# Patient Record
Sex: MALE | Race: WHITE | NOT HISPANIC OR LATINO | Employment: UNEMPLOYED | URBAN - METROPOLITAN AREA
[De-identification: names, ages, dates, MRNs, and addresses within clinical notes are randomized per-mention and may not be internally consistent; named-entity substitution may affect disease eponyms.]

---

## 2018-06-27 ENCOUNTER — CONVERSION ENCOUNTER (OUTPATIENT)
Dept: SURGERY | Facility: CLINIC | Age: 78
End: 2018-06-27

## 2018-06-27 ENCOUNTER — OFFICE VISIT CONVERTED (OUTPATIENT)
Dept: UROLOGY | Facility: CLINIC | Age: 78
End: 2018-06-27
Attending: UROLOGY

## 2018-07-20 ENCOUNTER — CONVERSION ENCOUNTER (OUTPATIENT)
Dept: UROLOGY | Facility: CLINIC | Age: 78
End: 2018-07-20
Attending: UROLOGY

## 2018-08-24 ENCOUNTER — CONVERSION ENCOUNTER (OUTPATIENT)
Dept: SURGERY | Facility: CLINIC | Age: 78
End: 2018-08-24

## 2018-08-24 ENCOUNTER — OFFICE VISIT CONVERTED (OUTPATIENT)
Dept: UROLOGY | Facility: CLINIC | Age: 78
End: 2018-08-24
Attending: UROLOGY

## 2019-02-04 ENCOUNTER — OFFICE VISIT CONVERTED (OUTPATIENT)
Dept: PULMONOLOGY | Facility: CLINIC | Age: 79
End: 2019-02-04
Attending: PHYSICIAN ASSISTANT

## 2019-06-14 ENCOUNTER — HOSPITAL ENCOUNTER (OUTPATIENT)
Dept: GENERAL RADIOLOGY | Facility: HOSPITAL | Age: 79
Discharge: HOME | End: 2019-06-14
Attending: PHYSICIAN ASSISTANT

## 2019-06-14 LAB
CREAT BLD-MCNC: 1.1 MG/DL (ref 0.6–1.4)
GFR SERPLBLD BASED ON 1.73 SQ M-ARVRAT: >60 ML/MIN/{1.73_M2}

## 2019-06-20 ENCOUNTER — OFFICE VISIT CONVERTED (OUTPATIENT)
Dept: PULMONOLOGY | Facility: CLINIC | Age: 79
End: 2019-06-20
Attending: INTERNAL MEDICINE

## 2019-10-07 ENCOUNTER — OFFICE VISIT CONVERTED (OUTPATIENT)
Dept: SURGERY | Facility: CLINIC | Age: 79
End: 2019-10-07
Attending: PHYSICIAN ASSISTANT

## 2019-10-18 ENCOUNTER — HOSPITAL ENCOUNTER (OUTPATIENT)
Dept: GENERAL RADIOLOGY | Facility: HOSPITAL | Age: 79
Discharge: HOME | End: 2019-10-18
Attending: PHYSICIAN ASSISTANT

## 2020-03-03 ENCOUNTER — CONVERSION ENCOUNTER (OUTPATIENT)
Dept: SURGERY | Facility: CLINIC | Age: 80
End: 2020-03-03

## 2020-03-03 ENCOUNTER — HOSPITAL ENCOUNTER (OUTPATIENT)
Dept: SURGERY | Facility: CLINIC | Age: 80
Discharge: HOME | End: 2020-03-03
Attending: PHYSICIAN ASSISTANT

## 2020-03-03 ENCOUNTER — OFFICE VISIT CONVERTED (OUTPATIENT)
Dept: SURGERY | Facility: CLINIC | Age: 80
End: 2020-03-03
Attending: PHYSICIAN ASSISTANT

## 2020-03-05 ENCOUNTER — HOSPITAL ENCOUNTER (OUTPATIENT)
Dept: SURGERY | Facility: CLINIC | Age: 80
Discharge: HOME | End: 2020-03-05
Attending: PHYSICIAN ASSISTANT

## 2020-03-07 LAB — BACTERIA UR CULT: NORMAL

## 2020-04-14 ENCOUNTER — OFFICE VISIT CONVERTED (OUTPATIENT)
Dept: PULMONOLOGY | Facility: CLINIC | Age: 80
End: 2020-04-14
Attending: PHYSICIAN ASSISTANT

## 2020-08-14 ENCOUNTER — CONVERSION ENCOUNTER (OUTPATIENT)
Dept: SURGERY | Facility: CLINIC | Age: 80
End: 2020-08-14

## 2020-08-14 ENCOUNTER — HOSPITAL ENCOUNTER (OUTPATIENT)
Dept: SURGERY | Facility: CLINIC | Age: 80
Discharge: HOME | End: 2020-08-14
Attending: NURSE PRACTITIONER

## 2020-08-14 ENCOUNTER — OFFICE VISIT CONVERTED (OUTPATIENT)
Dept: UROLOGY | Facility: CLINIC | Age: 80
End: 2020-08-14
Attending: NURSE PRACTITIONER

## 2020-08-16 LAB — BACTERIA UR CULT: NORMAL

## 2020-08-28 ENCOUNTER — CONVERSION ENCOUNTER (OUTPATIENT)
Dept: SURGERY | Facility: CLINIC | Age: 80
End: 2020-08-28

## 2020-08-28 ENCOUNTER — OFFICE VISIT CONVERTED (OUTPATIENT)
Dept: UROLOGY | Facility: CLINIC | Age: 80
End: 2020-08-28
Attending: NURSE PRACTITIONER

## 2021-05-10 NOTE — PROCEDURES
Procedure Note      Patient Name: Sergo Traylor   Patient ID: [id number]   Sex: Male   YOB: 1940    Primary Care Provider: Abhilash Ford MD   Referring Provider: Abhilash Ford MD    Visit Date: August 28, 2020    Provider: ANDRA Martinez   Location: [facility]   Location Address: [address] [place] [address]  [id number]   Location Phone: [phone]             patient arrived here with care giver Mi, she was given literature on how to cic. she was able to return demonstrate the proper technique for cic. all questions were answered. and patient care giver, Mi, was educated on if any questions to contact office. Mi stated understanding. sc           Assessment  · Urinary retention     788.20/R33.9      Plan  · Orders  o In-and-out bladder catheterization (12005) - 788.20/R33.9 - 08/28/2020  · Medications  o Medications have been Reconciled  o Transition of Care or Provider Policy            Electronically Signed by: ANDRA Martinez -Author on August 28, 2020 09:44:40 AM

## 2021-05-13 NOTE — PROGRESS NOTES
Progress Note      Patient Name: Sergo Traylor   Patient ID: [id number]   Sex: Male   YOB: 1940    Primary Care Provider: Abhilash Ford MD   Referring Provider: Abhilash Ford MD    Visit Date: August 14, 2020    Provider: ANDRA Martinez   Location: [facility]   Location Address: [address] [place] [address]  [id number]   Location Phone: [phone]          Chief Complaint     Here for annual evaluation as well as medication refill       History Of Present Illness     80-year-old  gentleman with nephrolithiasis and is here for  urinary retention.     The patient has been taking Pyridium the last several days related to increased frequency.    his PVR today is 377ml           Past Medical History  Disease Name Date Onset Notes   Acute cystitis 06/29/2018 --    AR (allergic rhinitis) --  --    Clot --  --    GERD --  --    Prostatic Hypertrophy, Benign --  --          Past Surgical History  Procedure Name Date Notes   Appendectomy --  --    Cataract exctraction with lens implant --  --    Colonoscopy --  --    Kidney Stone Surgery, Unspecified --  --    Prostate Surgery --  --    Skin cancer removed. --  --          Medication List  Name Date Started Instructions   Claritin 10 mg oral tablet  take 1 tablet (10 mg) by oral route once daily   Eliquis 5 mg oral tablet  take 1 tablet (5 mg) by oral route 2 times per day   famotidine 20 mg oral tablet  take 1 tablet (20 mg) by oral route once daily at bedtime   Flomax 0.4 mg oral capsule 03/03/2020 take 1 capsule (0.4 mg) by oral route once daily 1/2 hour following the same meal each day for 90 days         Allergy List  Allergen Name Date Reaction Notes   Bactrim DS --  --  nausea/vomiting         Family Medical History  Disease Name Relative/Age Notes   Stomach Neoplasm, Malignant Father/   --    Brain Neoplasm, Malignant Brother/   --    Hypertension Mother/   --          Social History  Finding Status  Start/Stop Quantity Notes   Tobacco Never --/-- --  --          Review of Systems  · Constitutional  o Denies  o : chills  · Respiratory  o Denies  o : cough  · Gastrointestinal  o Denies  o : nausea  · Genitourinary  o Denies  o : blood in urine, testicular swelling, urine infections      Vitals  Date Time BP Position Site L\R Cuff Size HR RR TEMP (F) WT  HT  BMI kg/m2 BSA m2 O2 Sat        08/14/2020 11:12 AM       15  125lbs 2oz 6'   16.97 1.7           Physical Examination  · Constitutional  o Appearance  o : Well-appearing, well-developed, in no acute distress  · Head and Face  o Head  o :   § Inspection  § : atraumatic, normocephalic  o Face  o :   § Inspection  § : no facial lesions  · Eyes  o Conjunctivae  o : conjunctivae normal  o Sclerae  o : sclerae white  · Neck  o Inspection/Palpation  o : Normal appearance, no masses or tenderness, trachea midline  · Respiratory  o Respiratory Effort  o : Unlabored breathing  · Skin and Subcutaneous Tissue  o General Inspection  o : no rashes present, no lesions present, no areas of discoloration          Results  · In-Office Procedures  o Lab procedure  § Automated dipstick urinalysis with microscopy (19343)   § Color Ur: Orange   § Clarity Ur: Clear   § Glucose Ur Ql Strip: 100   § Bilirub Ur Ql Strip: Negative   § Ketones Ur Ql Strip: Negative   § Sp Gr Ur Qn: 1.020   § Hgb Ur Ql Strip: Negative   § pH Ur-LsCnc: 5.0   § Prot Ur Ql Strip: Negative   § Urobilinogen Ur Strip-mCnc: 0.2   § Nitrite Ur Ql Strip: Positive   § WBC Est Ur Ql Strip: Negative   § RBC UrnS Qn HPF: 0   § WBC UrnS Qn HPF: 0   § Bacteria UrnS Qn HPF: tntc   § Crystals UrnS Qn HPF: 0   § Epithelial Cells (non renal): 0 /HPF  § Epithelial Cells (renal): 0       Assessment  · Nephrolithiasis     592.0/N20.0  · Prostatic Hypertrophy, Benign     600.0  · Urinary retention     788.20/R33.9      Plan  · Orders  o Urine Culture (Clean Catch) [facility] (38041) - 788.20/R33.9 -  08/14/2020  · Medications  o tamsulosin 0.4 mg oral capsule   SIG: take 2 capsules (0.8 mg) by oral route once daily 1/2 hour following the same meal each day for 30 days   DISP: (60) capsules with 11 refills  Prescribed on 08/14/2020     o finasteride 5 mg oral tablet   SIG: take 1 tablet (5 mg) by oral route once daily for 30 days   DISP: (30) tablets with 11 refills  Prescribed on 08/14/2020     o Flomax 0.4 mg oral capsule   SIG: take 1 capsule (0.4 mg) by oral route once daily 1/2 hour following the same meal each day for 90 days   DISP: (90) capsules with 4 refills  Discontinued on 08/14/2020     o Medications have been Reconciled  o Transition of Care or Provider Policy  · Instructions  o Increase tamsulosin to 0.8 mg daily. Will add finasteride to POC as well. Patient's brother states that the sister-in-law has been catheterizing the patient as needed. Educated that they can continue this as long as they are using disposable catheters and a new one every time. Will continue to catheterize 2-3 times a day as needed provided with 14 French coud catheters to use for the next 2 weeks. Educated that in the 2 days prior to the patient's next appointment they will need to measure urine and catheterize for postvoid residual and document those and bring to appointment. Patient will follow-up in office in 2 weeks for PVR.            Electronically Signed by: ANDRA Martinez -Author on August 14, 2020 12:31:44 PM

## 2021-05-14 VITALS — BODY MASS INDEX: 16.73 KG/M2 | HEIGHT: 72 IN | WEIGHT: 123.5 LBS | RESPIRATION RATE: 14 BRPM

## 2021-05-15 VITALS — RESPIRATION RATE: 14 BRPM | HEIGHT: 72 IN | WEIGHT: 148 LBS | BODY MASS INDEX: 20.05 KG/M2

## 2021-05-15 VITALS — WEIGHT: 159.12 LBS | RESPIRATION RATE: 16 BRPM | HEIGHT: 72 IN | BODY MASS INDEX: 21.55 KG/M2

## 2021-05-15 VITALS — RESPIRATION RATE: 15 BRPM | HEIGHT: 72 IN | WEIGHT: 125.12 LBS | BODY MASS INDEX: 16.95 KG/M2

## 2021-05-16 VITALS — WEIGHT: 164 LBS | BODY MASS INDEX: 22.96 KG/M2 | HEIGHT: 71 IN | RESPIRATION RATE: 14 BRPM

## 2021-05-16 VITALS — BODY MASS INDEX: 22.96 KG/M2 | RESPIRATION RATE: 15 BRPM | HEIGHT: 71 IN | WEIGHT: 164 LBS

## 2021-05-16 VITALS — WEIGHT: 165.12 LBS | HEIGHT: 72 IN | RESPIRATION RATE: 18 BRPM | BODY MASS INDEX: 22.37 KG/M2

## 2021-05-25 ENCOUNTER — HOSPITAL ENCOUNTER (OUTPATIENT)
Dept: GENERAL RADIOLOGY | Facility: HOSPITAL | Age: 81
Discharge: HOME | End: 2021-05-25
Attending: PHYSICIAN ASSISTANT

## 2021-05-27 ENCOUNTER — TRANSCRIBE ORDERS (OUTPATIENT)
Dept: PULMONOLOGY | Facility: CLINIC | Age: 81
End: 2021-05-27

## 2021-05-27 ENCOUNTER — TRANSCRIBE ORDERS (OUTPATIENT)
Dept: ADMINISTRATIVE | Facility: HOSPITAL | Age: 81
End: 2021-05-27

## 2021-05-28 VITALS
BODY MASS INDEX: 20.49 KG/M2 | HEART RATE: 91 BPM | OXYGEN SATURATION: 97 % | SYSTOLIC BLOOD PRESSURE: 117 MMHG | DIASTOLIC BLOOD PRESSURE: 63 MMHG | RESPIRATION RATE: 14 BRPM | HEIGHT: 72 IN | WEIGHT: 151.31 LBS

## 2021-05-28 VITALS
HEART RATE: 83 BPM | TEMPERATURE: 98.4 F | HEIGHT: 72 IN | SYSTOLIC BLOOD PRESSURE: 124 MMHG | RESPIRATION RATE: 16 BRPM | WEIGHT: 150.06 LBS | BODY MASS INDEX: 20.33 KG/M2 | OXYGEN SATURATION: 99 % | DIASTOLIC BLOOD PRESSURE: 82 MMHG

## 2021-05-28 NOTE — PROGRESS NOTES
Patient: ROSALBA VARGAS     Acct: [id number]     Report: #[id number]  UNIT #: [id number]     : 1940    Encounter Date:2019  PRIMARY CARE: Abhilash Ford  ***Signed***  --------------------------------------------------------------------------------------------------------------------  Chief Complaint      Encounter Date      2019            Primary Care Provider      ETHAN SCHWARTZ            Referring Provider      ETHAN SCHAWRTZ            Patient Complaint      Patient is complaining of      Pt here for 3 month follow up/chest ct, lab results/ Pulmonary Embolism            VITALS      Height 6 ft 0 in / 182.88 cm      Weight 150 lbs 1 oz / 68.51739 kg      BSA 1.89 m2      BMI 20.4 kg/m2      Temperature 98.4 F / 36.89 C - Oral      Pulse 83      Respirations 16      Blood Pressure 124/82 Sitting, Right Arm      Pulse Oximetry 99%, room air            HPI      The patient is a 79 year old male with acute bilateral pulmonary embolism on     anticoagulation with eliquis, acute hypoxic respiratory failure with a diagnosis    of pulmonary embolism,  left lower extremity deep vein thrombosis,     bronchiectasis with recent acute exacerbation and chronic cognitive impairment.     He is here for follow up today.             Since his last office visit when he was seen by LEILA Contreras  he was     continued on eliquis 5 mg twice daily. He had CT scan of the chest which was     reviewed with him today. CT scan of the chest showed small residual filling     defect on the right lower lobe likely related to previous pulmonary embolism. He    does not have any problems with eliquis. He has no shortness of breath, no fever    or chills, no nausea or vomiting, no chest pain or chest tightness. He is     tolerating eliquis well. He does have bronchiectasis and intermittent cough. He     does not have significant problems with it.            ROS      Constitutional:  Complains of: Fatigue;  Denies: Fever, Weight gain, Weight loss,    Chills, Insomnia, Other      Respiratory/Breathing:  Denies: Shortness of air, Wheezing, Cough, Hemoptysis,     Pleuritic pain, Other      Endocrine:  Denies: Polydipsia, Polyuria, Heat/cold intolerance, Diabetes, Other      Eyes:  Denies: Blurred vision, Vision Changes, Other      Ears, nose, mouth, throat:  Denies: Mouth lesions, Thrush, Throat pain,     Hoarseness, Allergies/Hay Fever, Post Nasal Drip, Headaches, Recent Head Injury,    Nose Bleeding, Neck Stiffness, Thyroid Mass, Hearing Loss, Ear Fullness, Dry     Mouth, Nasal or Sinus Pain, Dry Lips, Nasal discharge, Nasal congestion, Other      Cardiovascular:  Denies: Palpitations, Syncope, Claudication, Chest Pain, Wake     up Gasping for air, Leg Swelling, Irregular Heart Rate, Cyanosis, Dyspnea on     Exertion, Other      Gastrointestinal:  Denies: Nausea, Constipation, Diarrhea, Abdominal pain,     Vomiting, Difficulty Swallowing, Reflux/Heartburn, Dysphagia, Jaundice,     Bloating, Melena, Bloody stools, Other      Genitourinary:  Denies: Urinary frequency, Incontinence, Hematuria, Urgency,     Nocturia, Dysuria, Testicular problems, Other      Musculoskeletal:  Denies: Joint Pain, Joint Stiffness, Joint Swelling, Myalgias,    Other      Hematologic/lymphatic:  DENIES: Lymphadenopathy, Bruising, Bleeding tendencies,     Other      Neurological:  Denies: Headache, Numbness, Weakness, Seizures, Other      Psychiatric:  Denies: Anxiety, Appropriate Effect, Depression, Other      Sleep:  No: Excessive daytime sleep, Morning Headache?, Snoring, Insomnia?, Stop    breathing at sleep?, Other      Integumentary:  Denies: Rash, Dry skin, Skin Warm to Touch, Other      Immunologic/Allergic:  Denies: Latex allergy, Seasonal allergies, Asthma,     Urticaria, Eczema, Other      Immunization status:  No: Up to date            FAMILY/SOCIAL/MEDICAL HX      Surgical History:  Yes: Appendectomy (14 YEARS OLD), Bladder Surgery  (URETHRAL     TEAR REPAIRED, KIDNEY REFLUX REPAIRED,), Bowel Surgery (COLONOSCOPIES), Head     Surgery (BILATERAL CATARACT SURGERY), Oral Surgery (CYST REMOVED FROM LEFT CHEEK    ), Orthopedic Surgery (RE-ATTACHED FINGERS LEFT HAND, RT SHOULDER CYST REMOVED),    Other Surgeries (CYST REMOVED FROM LEFT CHEEK   REMOVAL   Heart - Family Hx:  Mother      Cancer/Type - Family Hx:  Father      Is Father Still Living?:  No      Is Mother Still Living?:  No       Family History:  Yes      Social History:  Tobacco Use      Smoking status:  Never smoker      Anticoagulation Therapy:  Yes      Antibiotic Prophylaxis:  No      Medical History:  Yes: Arthritis (SHOULDER AND HIPS), Heart Attack,     Hemorrhoids/Rectal Prob (REFLUX), Shortness Of Breath (PRONE TO PNEUMONIA     YEARLY); No: Asthma, Blood Disease, Chemotherapy/Cancer, Chronic     Bronchitis/COPD, Congestive Heart Failu, Deafness or Ringing Ears, Diabetes,     Seizures, High Blood Pressure, Sinus Trouble, Miscellaneous Medical/oth      Psychiatric History      None            PREVENTION      Hx Influenza Vaccination:  Yes      Date Influenza Vaccine Given:  Oct 1, 2018      Influenza Vaccine Declined:  No      2 or More Falls Past Year?:  No      Fall Past Year with Injury?:  No      Hx Pneumococcal Vaccination:  Yes      Encouraged to follow-up with:  PCP regarding preventative exams.      Chart initiated by      Lucy Stevens MA            ALLERGIES/MEDICATIONS      Allergies:        Coded Allergies:             NO KNOWN ALLERGIES (Unverified , 6/20/19)      Medications    Last Reconciled on 6/20/19 11:31 by MARCELLO OSMAN MD      Apixaban (Eliquis) 5 Mg Tablet      5 MG PO BID for 30 Days, #60 TAB 6 Refills         Prov: Marcello Osman         6/20/19       Apixaban (Eliquis) 5 Mg Tablet      5 MG PO BID for 30 Days, #60 TAB 3 Refills         Prov: Nova Powell PA-C         2/4/19       Neb-NaCl 3% (Sodium Chloride 3% Neb) 4 Ml Vial.neb      4 ML INH RTBID PRN  for CONGESTION for 30 Days, #60 NEB 4 Refills         Prov: Nova Powell PA-C         2/4/19       Multivitamin (Multivitamins) 1 Each Capsule      1 EACH PO QDAY, CAP         Reported         1/18/19       Cetirizine Hcl (CETIRIZINE HCL) 10 Mg Tablet      10 MG PO QDAY, #30 TAB 0 Refills         Reported         1/18/19       Acetaminophen Es (Tylenol Extra Strength) 500 Mg Tablet      1000 MG PO PRN for pain, #100 TAB 0 Refills         Reported         1/18/19       Terazosin (Hytrin) 1 Mg Capsule      2 MG PO HS, CAP         Reported         1/18/19       Omeprazole (Omeprazole*) 20 Mg Tablet.dr      20 MG PO QAM, #30 CAP 0 Refills         Reported         1/18/19      Current Medications      Current Medications Reviewed 6/20/19            EXAM      GEN-patient appears stated age resting comfortable in no acute distress      Eyes-PERRL,  conjunctiva are normal in appearance extraocular muscles are     intact, no scleral icterus      Nasal-both nares are patent turbinates appear normal no polyps seen no nasal     discharge or ulcerations      Ears-tympanic membranes are normal no erythema no bulging, normal to inspection      Lymphatic-no swollen or enlarged cervical nodes, or axillary node, or femoral     nodes, or supraclavicular nodes      Mouth normal dentition, no erythema no ulcerations oropharynx appears normal no     exudate no evidence of postnasal drip, MP(default value)      Neck-there are no palpable supraclavicular or cervical adenopathy, thyroid is     normal in appearance no apparent nodules, there is no inspiratory or expiratory     stridor      Respiratory-Grossly clear to auscultation bilaterally without wheezes, rhonchi     or crackles appreciated.  Normal work of breathing noted.       Cardiovascular-the heart rate is normal and regular S1 and S2 present with no     murmur or extra heart sounds, there is no JVD or pedal edema present      GI-the abdomen is normal in appearance, bowel  sounds present and normal in all     quadrants no hepatosplenomegaly or masses felt      Extremities-no clubbing is present, pulses present in all extremities, capillary    refill time is normal      Musculoskeletal-Normal strength in upper and lower extremities, inspection shows    no evidence of muscle atrophy      Skin-skin is normal in appearance it is warm and dry, no rashes present, no     evidence of cyanosis, palpation reveals no masses      Neurological-the patient is alert and oriented to time place and person, moves     all 4 extremities, normal gait, normal affect and mood, CN2-12 intact      Psych-normal judgment and insight is good, normal mood and affect, alert and     oriented to person, place, and time, and date      Vtials      Vitals:             Height 6 ft 0 in / 182.88 cm           Weight 150 lbs 1 oz / 68.54816 kg           BSA 1.89 m2           BMI 20.4 kg/m2           Temperature 98.4 F / 36.89 C - Oral           Pulse 83           Respirations 16           Blood Pressure 124/82 Sitting, Right Arm           Pulse Oximetry 99%, room air            REVIEW      Results Reviewed      PCCS Results Reviewed?:  Yes Prev Lab Results, Yes Prev Radiology Results, Yes     Previous Mecial Records      Micro Results      Patient: ROSALBA VARGAS   Acct: #[id number]   Report: #[id number]            UNIT #: [id number]    DOS: 19      LOCATION:[address] [place] [address]   : 1940            PROVIDERS      ADMITTING:  Gavino Escobedo   FAMILY:  ETHAN SCHWARTZ         OTHER:       DICTATING:  KAREN DAS MD            REASON FOR VISIT:  CAP,PULMONARY EMOBLI            *******Signed******                                    [facility]                          [employer] Services                            [place], [place]  [id number]               __________________________________________________________________________             Patient Name:                   Attending  Physician:      Sergo Traylor M.D.             Patient Visit # MR #            Admit Date  Disch Date     Location      [id number]    [id number]      01/18/2019 [id number]             Date of Birth      1940      __________________________________________________________________________      835 - DIAGNOSTIC REPORT             2-D AND M-MODE ECHOCARDIOGRAM REPORT             DATE:      01/19/2019             INDICATION:      PE.                                    NORMAL RANGE                TEST RESULTS      _______________________________________________________________________                                                   Systolic       Diastolic      RVID                    0.7-2.4      LVID                    3.7-5.4      POST. WALL THICKNESS    0.8-1.1      SEPTAL WALL THICKNESS   0.7-1.2      LAID                    1.9-3.8      AORTIC ROOT DIMENSION   2.0-3.7      MTRL. VLV. YASMANY. D.D.R. 80mm/sec-150mm/sec      _______________________________________________________________________             METHOD: The patient underwent 2-D and M-Mode echocardiography in apical,      two-chamber and four-chamber views.  The images were technically adequate.             1. MITRAL VALVE:       Appeared to be normal.      2. AORTIC VALVE:       Appeared to be normal.      3. TRICUSPID VALVE:    Appeared to be normal.      4. PULMONIC VALVE:     Not well seen.      5. AORTIC ROOT:        Normal in size.      6. LEFT ATRIUM:        Normal in size.      7. LEFT VENTRICLE:     Normal ejection fraction of 55% with mild left          ventricular hypertrophy.  No wall motion abnormalities, cavitary          dilation, or wall thickening.      8. RIGHT VENTRICLE:    Normal in size and function.      9. RIGHT ATRIUM:       Normal.      10.                   PERICARDIUM:    Did not have any obvious effusion.             Doppler flow examination across the aortic, mitral,  pulmonary, and tricuspid      valves did not reveal any significant stenosis or regurgitation.             IMPRESSION:      1.  Normal ejection fraction of 55%.      2.  No significant valvular issues.             To be electronically signed in [employer]      [id number] KAREN DAS M.D.             JH:vh      D:  2019 11:00      T:  2019 11:43      #[id number]             Until signed, this is an unconfirmed preliminary report that may contain      errors and is subject to change.                   Until signed, this is an unconfirmed preliminary report that may contain      errors and is subject to change.                     <Electronically signed by KAREN DAS MD>                19 1452               KAREN DAS MD                                                                  [id number]:[facility]      D:19 1100      PFT Results               [facility] Diagnostic Img                PACS RADIOLOGY REPORT            Patient: ROSALBA VARGAS   Acct: #[id number]   Report: #[id number]            UNIT #: [id number]    DOS: 19 1230      INSURANCE:MEDICARE PART A   LOCATION:[facility]     : 1940            PROVIDERS      ADMITTING:     ATTENDING: Nova Powell PA-C      FAMILY:  NONE,MD   ORDERING:  Nova Powell PA-C         OTHER:    DICTATING:  Adam Munoz MD            REQ #:[id number]   EXAM:CHW - CT CHEST with CONTRAST      REASON FOR EXAM:        REASON FOR VISIT:  PULM EMB            *******Signed******         PROCEDURE:   CT CHEST W/ CONTRAST             COMPARISON:   [facility], CT, CHEST W/ CONTRAST, 2019, 14:22.             INDICATIONS:   F/U PE FROM 2019, PATIENT TAKING BLOOD THINNERS STILL, CREAT     1.1  19, GFR >64             TECHNIQUE:   CT images were obtained with non-ionic intravenous contrast ma    terial.               PROTOCOL:     PE protocol was performed.                RADIATION:     DLP: 260.4mGy*cm           Automated exposure control was utilized to minimize radiation dose.       CONTRAST:   100cc Isovue 370 I.V.      LABS:     eGFR: >60ml/min/1.73m2             FINDINGS:         The pulmonary arteries are well opacified.  A few tiny filling defects are seen     in the right lower       lobe pulmonary arteries.  These probably represent residua of fairly extensive     pulmonary       thromboembolic disease seen on 1/18/2019.             Lung window images reveal mild cylindrical bronchiectasis and bronchial wall     thickening.             Airspace disease is improved.  No consolidation or mass is evident.             Mediastinal windows reveal no mediastinal, hilar, or axillary adenopathy the.             A small hiatal hernia is evident.             Tiny stones layer in the dependent portion of the gallbladder.             CONTINUED ON NEXT PAGE...             CONCLUSION:         CT scan of the chest with IV contrast demonstrating tiny filling defects in     right lower lobe       pulmonary arteries which probably represent residua of pulmonary thromboembolic     disease seen on       1/18/2019.             Mild cylindrical bronchiectasis and bronchial wall thickening.              OLVIN QUESADA MD             Electronically Signed and Approved By: OLVIN QUESADA MD on 6/14/2019 at 16:13                           Until signed, this is an unconfirmed preliminary report that may contain      errors and is subject to change.                                              COUST:      D:06/14/19 1613            Assessment      Pulmonary embolism         Other acute pulmonary embolism without acute cor pulmonale - I26.99         Pulmonary embolism type: other         Chronicity: acute         Acute cor pulmonale presence: without acute cor pulmonale            DVT (deep venous thrombosis)         Acute deep vein thrombosis (DVT) of femoral vein of left lower extremity -        I82.412         DVT location: lower  extremity         Affected thrombotic vein of extremity: femoral         Chronicity: acute         Laterality: left            Notes      New Medications      * Apixaban (Eliquis) 5 MG TABLET: 5 MG PO BID 30 Days #60      New Diagnostics      * Echo Complete, 6 Months         Dx: Pulmonary embolism - I26.99      * Chest W/ Cont CT, 6 Months         Dx: Pulmonary embolism - I26.99      * Venous Eval-Lower, 6 Months         Dx: Acute deep vein thrombosis (DVT) of femoral vein of left lower extremity        - I82.412      PLAN:      The patient is a 79 year old male with history of cognitive impairment who had     deep vein thrombosis/pulmonary embolism in January 2018.            1. Deep vein thrombosis/pulmonary embolism. Small pulmonary embolism and filling    defect is seen at the right lower lobe. I will continue with eliquis 5 mg twice     daily for now. Check echocardiogram and lower extremity ultrasound and CT scan     of the chest in 6 months. If it is stable we will stop anticoagulation then.     This was his first episode of pulmonary embolism related to immobility.       2. We will follow up in 6 months and see if we can stop his eliquis.       3. Continue with nebulizer as needed.       4. Follow up in 6 months earlier if needed.            Patient Education      Education resources provided:  Yes      Patient Education Provided:  Acute Bronchitis                 Disclaimer: Converted document may not contain table formatting or lab diagrams. Please see [employer] for the authenticated document.

## 2021-05-28 NOTE — PROGRESS NOTES
Patient: SERGO TRAYLOR     Acct: [id number]     Report: #[id number]  UNIT #: [id number]     : 1940    Encounter Date:2020  PRIMARY CARE: Abhilash Ford  ***Signed***  --------------------------------------------------------------------------------------------------------------------  TELEHEALTH NOTE      History of Present Illness      Chief Complaint: Patients visit is for med refills on Eliquis            Sergo Traylor is presenting for evaluation via Telehealth visit. Verbal consent     obtained before beginning visit.            Provider spent (11) minutes with the patient during telehealth visit.            The following staff were present during the visit: Brittney Powell PA-C, Maisha Sanchez MA            The patient is a 80 year old male known to me from a previous office visit, most     recently seen by Dr. Osman in 2019. His wife is on the phone with him as     he has a history of cognitive impairment. He has a history of acute bilateral     PE's in 2018. On his most recent CT scan of the chest done in 2019     he still had some small residual filling defects in the right lower lobe     probably with residual thromboembolic disease. He also has mild bronchiectasis     noted on CT scan of the chest. Decision was made to continue him on eliquis 5 mg     twice daily and repeat CT scan of the chest, echocardiogram and lower extremity     venous duplex in 6 months and pending what those showed, anticoagulation could     be stopped however, the patient never had repeat testing done. He is very     inactive and his wife is concerned about him coming off the eliquis. He denies     any dyspnea, coughing or wheezing. He denies chest pain, lower extremity edema,     palpitations or orthopnea. His only recent issues have been kidney stones and     BPH and he follows with Dr. Alvarez from urology for this. The patient denies     any hemoptysis, hematuria or blood in his stool.              I reviewed the Review of Systems, medical, surgical and family history and agree     with those as entered.                            Past Med History      Patient has past illness of Pulmonary Embolism, Acute Respiratory Failure w/     hypoxia, Bronchiectasis. He has never been a smoker and is current on both flu     and pneumonia vaccinations.      Overview of Symptoms      Patient has no complaints today.            Plan/Instructions      Ambulatory Assessment/Plan:        Pulmonary emboli - I26.99            Notes      New Medications      * Tamsulosin HCL (Flomax) 0.4 MG CAPSULE: 0.4 MG PO QDAY #30      New Diagnostics      * Chest W/ Cont CT, 2 Months         Dx: Pulmonary emboli - I26.99      * Echo Complete, 2 Months         Dx: Pulmonary emboli - I26.99      * Venous Eval-Lower, 2 Months         Dx: Pulmonary emboli - I26.99      Plan/Instructions      * Plan Of Care: ()            * Chronic conditions reviewed and taken into consideration for today's treatment       plan.      * Patient instructed to seek medical attention urgently for new or worsening       symptoms.      * Patient was educated/instructed on their diagnosis, treatment and medications       prior to discharge from the clinic today.            ASSESSMENT:      1. History of deep vein thrombosis and pulmonary emboli in January 2018, still o    n eliquis 5 mg twice daily.       2. History of cognitive impairment.      3. History of mild bronchiectasis without acute exacerbation.       4. Medical noncompliance.       5. Recent history of kidney stones and BPH followed by Dr. Valentine.             PLAN:      1. I have discussed with the patient and his wife that he has not had repeat CT     scan of the chest, echocardiogram and lower extremity venous duplex so I will     continue him on eliquis 5 mg twice daily until that testing can be performed.     Given COVID-19 pandemic, the patient and his wife prefer to have this testing      delayed so we will have this scheduled in 2-3 months or later when feasible.  I     refilled his eliquis today.       2. After his testing has been done, he can be seen for follow up and we can     determine if his eliquis should be stopped or continue current dose or decrease     dose to 2.5 mg twice daily given his physical inactivity.       3. Continue nebulizer as needed.       4. Continue to follow up with neurology as scheduled.       5. Follow up in 3 months with Dr. Osman, sooner if needed.      Codes:  Phone Eval 11-20 mi [id number]            Electronically signed by GORDO GAMA PA-C  04/21/2020 10:39       Disclaimer: Converted document may not contain table formatting or lab diagrams. Please see [employer] for the authenticated document.

## 2021-05-28 NOTE — PROGRESS NOTES
Patient: ROSALBA VARGAS     Acct: [id number]     Report: #[id number]  UNIT #: [id number]     : 1940    Encounter Date:2019  PRIMARY CARE: Abhilash Ford  ***Signed***  --------------------------------------------------------------------------------------------------------------------  Chief Complaint      Encounter Date      2019            Primary Care Provider      ETHAN SCHWARTZ            Referring Provider      ETHAN SCHWARTZ            Patient Complaint      Patient is complaining of      talha            VITALS      Height 6 ft 0 in / 182.88 cm      Weight 151 lbs 5 oz / 68.6342 kg      BSA 1.89 m2      BMI 20.5 kg/m2      Pulse 91      Respirations 14      Blood Pressure 117/63 Sitting, Right Arm      Pulse Oximetry 97%, room air            HPI      The patient is a very pleasant 79 year old white male who was recently seen by     Dr. Osman and Dr. Montesinos while hospitalized at [facility]. He     presented on 19 with right sided chest pain and dyspnea. He has a history     of chronic cognitive impairment and lives with his brother and sister in law and    was not very active over the winter. He was watching a lot of TV and was treated    for bronchoscopy back in early January by his primary care provider . He was     found to have acute bilateral pulmonary emboli on chest CT scan. He was also     found to have pneumonia as well as bronchiectasis in bilateral lung bases. He     was started on heparin drip, transitioned to Eliquis and had an echocardiogram     that was within normal limits and did not show any right heart strain. He had     lower extremity venous Duplex that showed a right superficial femoral through     tibial peroneal deep vein thrombosis that appeared acute. He was treated with     antibiotics of his pneumonia and gradually improved. The patient's sister in law    and brother provide most of the history today but report he has done much  better    and he tells them he feels good and back to his normal self.  They denies any     increased dyspnea, coughing or wheezing, hemoptysis, fevers or chills. They deny    any bleeding issues such as blood in his urine or stool with the Eliquis. He has    had several episodes of pneumonia but remotely, nothing recently. He was not     aware of his previous diagnosis of bronchiectasis.             I have personally reviewed the Review of Systems, past family, social, surgical     and medical histories and I agree with the findings.      Copies To:   Adolfo Renteria      Constitutional:  Denies: Fatigue, Fever, Weight gain, Weight loss, Chills,     Insomnia, Other      Respiratory/Breathing:  Complains of: Cough; Denies: Shortness of air, Wheezing,    Hemoptysis, Pleuritic pain, Other      Endocrine:  Denies: Polydipsia, Polyuria, Heat/cold intolerance, Diabetes, Other      Eyes:  Denies: Blurred vision, Vision Changes, Other      Ears, nose, mouth, throat:  Denies: Mouth lesions, Thrush, Throat pain,     Hoarseness, Allergies/Hay Fever, Post Nasal Drip, Headaches, Recent Head Injury,    Nose Bleeding, Neck Stiffness, Thyroid Mass, Hearing Loss, Ear Fullness, Dry     Mouth, Nasal or Sinus Pain, Dry Lips, Nasal discharge, Nasal congestion, Other      Cardiovascular:  Denies: Palpitations, Syncope, Claudication, Chest Pain, Wake     up Gasping for air, Leg Swelling, Irregular Heart Rate, Cyanosis, Dyspnea on     Exertion, Other      Gastrointestinal:  Denies: Nausea, Constipation, Diarrhea, Abdominal pain,     Vomiting, Difficulty Swallowing, Reflux/Heartburn, Dysphagia, Jaundice,     Bloating, Melena, Bloody stools, Other      Genitourinary:  Denies: Urinary frequency, Incontinence, Hematuria, Urgency,     Nocturia, Dysuria, Testicular problems, Other      Musculoskeletal:  Denies: Joint Pain, Joint Stiffness, Joint Swelling, Myalgias,    Other      Hematologic/lymphatic:  DENIES: Lymphadenopathy,  Bruising, Bleeding tendencies,     Other      Neurological:  Denies: Headache, Numbness, Weakness, Seizures, Other      Psychiatric:  Denies: Anxiety, Appropriate Effect, Depression, Other      Sleep:  No: Excessive daytime sleep, Morning Headache?, Snoring, Insomnia?, Stop    breathing at sleep?, Other      Integumentary:  Denies: Rash, Dry skin, Skin Warm to Touch, Other      Immunologic/Allergic:  Denies: Latex allergy, Seasonal allergies, Asthma,     Urticaria, Eczema, Other      Immunization status:  No: Up to date            FAMILY/SOCIAL/MEDICAL HX      Surgical History:  Yes: Appendectomy (14 YEARS OLD), Bladder Surgery (URETHRAL     TEAR REPAIRED, KIDNEY REFLUX REPAIRED,), Bowel Surgery (COLONOSCOPIES), Head     Surgery (BILATERAL CATARACT SURGERY), Oral Surgery (CYST REMOVED FROM LEFT CHEEK    ), Orthopedic Surgery (RE-ATTACHED FINGERS LEFT HAND, RT SHOULDER CYST REMOVED),    Other Surgeries (CYST REMOVED FROM LEFT CHEEK   REMOVAL   Heart - Family Hx:  Mother      Cancer/Type - Family Hx:  Father      Is Father Still Living?:  No      Is Mother Still Living?:  No      Social History:  Tobacco Use      Smoking status:  Never smoker      Anticoagulation Therapy:  Yes      Antibiotic Prophylaxis:  No      Medical History:  Yes: Arthritis (SHOULDER AND HIPS), Heart Attack,     Hemorrhoids/Rectal Prob (REFLUX), Shortness Of Breath (PRONE TO PNEUMONIA     YEARLY); No: Asthma, Blood Disease, Chemotherapy/Cancer, Chronic     Bronchitis/COPD, Congestive Heart Failu, Deafness or Ringing Ears, Diabetes,     Seizures, High Blood Pressure, Miscellaneous Medical/oth            PREVENTION      Hx Influenza Vaccination:  Yes      Date Influenza Vaccine Given:  Oct 1, 2018      2 or More Falls Past Year?:  No      Fall Past Year with Injury?:  No      Hx Pneumococcal Vaccination:  Yes      Encouraged to follow-up with:  PCP regarding preventative exams.      Chart initiated by      Day Munoz ma             ALLERGIES/MEDICATIONS      Allergies:        Coded Allergies:             NO KNOWN ALLERGIES (Unverified , 2/4/19)      Medications    Last Reconciled on 2/4/19 14:11 by LEILA AQUINO      Neb-NaCl 3% (Sodium Chloride 3% Neb) 4 Ml Vial.neb      4 ML INH RTBID PRN for CONGESTION for 30 Days, #60 NEB 4 Refills         Prov: Nova Powell PA-C         2/4/19       Apixaban (Eliquis) 5 Mg Tablet      5 MG PO BID for 30 Days, #60 TAB 3 Refills         Prov: Nova Powell PA-C         2/4/19       Cyanocobalamin (Vitamin B-12*) Unknown Strength Tab      PO QDAY, #30 TAB         Reported         1/18/19       Multivitamin (Multivitamins) 1 Each Capsule      1 EACH PO QDAY, CAP         Reported         1/18/19       Cetirizine Hcl (CETIRIZINE HCL) 10 Mg Tablet      10 MG PO QDAY, #30 TAB 0 Refills         Reported         1/18/19       Acetaminophen Es (Tylenol Extra Strength) 500 Mg Tablet      1000 MG PO PRN for pain, #100 TAB 0 Refills         Reported         1/18/19       Terazosin (Hytrin) 1 Mg Capsule      2 MG PO HS, CAP         Reported         1/18/19       Omeprazole (Omeprazole*) 20 Mg Tablet.dr      20 MG PO QAM, #30 CAP 0 Refills         Reported         1/18/19      Current Medications      Current Medications Reviewed 2/4/19            EXAM      GEN-patient appears stated age resting comfortable in no acute distress      Eyes-PERRL,  conjunctiva are normal in appearance extraocular muscles are     intact, no scleral icterus      Nasal-both nares are patent turbinates appear normal no polyps seen no nasal     discharge or ulcerations      Ears-tympanic membranes are normal no erythema no bulging, normal to inspection      Lymphatic-no swollen or enlarged cervical nodes, or axillary node, or femoral     nodes, or supraclavicular nodes      Mouth normal dentition, no erythema no ulcerations oropharynx appears normal no     exudate no evidence of postnasal drip, MP(default value)      Neck-there  are no palpable supraclavicular or cervical adenopathy, thyroid is     normal in appearance no apparent nodules, there is no inspiratory or expiratory     stridor      Respiratory-Grossly clear to auscultation bilaterally without wheezes, rhonchi     or crackles appreciated.  Normal work of breathing noted.       Cardiovascular-the heart rate is normal and regular S1 and S2 present with no     murmur or extra heart sounds, there is no JVD or pedal edema present      GI-the abdomen is normal in appearance, bowel sounds present and normal in all     quadrants no hepatosplenomegaly or masses felt      Extremities-no clubbing is present, pulses present in all extremities, capillary    refill time is normal      Musculoskeletal-Normal strength in upper and lower extremities, inspection shows    no evidence of muscle atrophy      Skin-skin is normal in appearance it is warm and dry, no rashes present, no     evidence of cyanosis, palpation reveals no masses      Neurological-the patient is alert and oriented to time place and person, moves     all 4 extremities, normal gait, normal affect and mood, CN2-12 intact      Psych-normal judgment and insight is good, normal mood and affect, alert and     oriented to person, place, and time, and date      Vtials      Vitals:             Height 6 ft 0 in / 182.88 cm           Weight 151 lbs 5 oz / 68.6342 kg           BSA 1.89 m2           BMI 20.5 kg/m2           Pulse 91           Respirations 14           Blood Pressure 117/63 Sitting, Right Arm           Pulse Oximetry 97%, room air            REVIEW      Results Reviewed      PCCS Results Reviewed?:  Yes Prev Lab Results, Yes Prev Radiology Results, Yes     Previous [facility] Records (I personally reviewed the patient's recent pulmonary     consultation, progress notes and discharge summary. )      Radiographic Results               [facility]                PACS RADIOLOGY  REPORT            Patient: ROSALBA VARGAS   Acct: #[id number]   Report: #[id number]            UNIT #: [id number]    DOS: 19 1322      INSURANCE:MEDICARE PART A   LOCATION:ER     : 1940            PROVIDERS      ADMITTING:     ATTENDING:       :  ETHAN SCHWARTZ   ORDERING:  XU THOMAS         OTHER:    DICTATING:  Collins Kothari MD            REQ #:[id number]   EXAM:CHW - CT CHEST with CONTRAST      REASON FOR EXAM:  Dyspnea      REASON FOR VISIT:  RT UPPER ABN PN            *******Signed******         PROCEDURE:   CT CHEST W/ CONTRAST             COMPARISON:   [facility], CT, CT CHEST W/ CONTRAST,     2009, 11:29.  [facility], CR, CHEST AP/PA 1 VIEW, 2019, 13:28.             INDICATIONS:   DYSPNEA, RIGHT LOWER CHEST/UPPER ABD PAIN.             TECHNIQUE:   After obtaining the patient's consent, CT images were obtained with    non-ionic       intravenous contrast material.               PROTOCOL:     Pulmonary embolism imaging protocol performed                RADIATION:     DLP: 326.2mGy*cm          Automated exposure control was utilized to minimize radiation dose.       CONTRAST:   100cc Isovue 370 I.V.      LABS:     eGFR: >60ml/min/1.73m2             FINDINGS:         There are multiple acute intraluminal pulmonary emboli in the distal right     pulmonary artery       extending into the right upper lobe and right lower lobe pulmonary artery's.      There also appears to       be a small pulmonary embolus in the left upper lobe pulmonary artery and several    segmental left       lower lobe pulmonary artery's.  There is a 9 cm hiatal hernia.  There is no     mediastinal or hilar or       axillary adenopathy.  There are bilateral patchy lower lobe areas of infiltrate     and atelectasis       right greater than left with an appearance suggesting pneumonia especially in     the right lung base.        There is left lower lobe  bronchiectasis.  Several of the bronchi a which are     dilated in the right       lower lobe have soft tissue density filling them which could reflect inspissated    secretions or       endobronchial infection.  There is cholelithiasis.             CONCLUSION:                1.  Bilateral upper and lower lobe acute pulmonary emboli right greater than     left.             2.  Bibasilar areas of infiltrate and patchy consolidation suggesting pneumonia     right greater than       left.  There is also bilateral lower lobe bronchiectasis.  Several of the     dilated bronchi in the       right lower lobe may contain inspissated secretions and a or endobronchial     infection.             3.  Cholelithiasis.              MIKHAIL HERNANDEZ MD             Electronically Signed and Approved By: MIKHAIL HERNANDEZ MD on 1/18/2019 at 14:57                           Until signed, this is an unconfirmed preliminary report that may contain      errors and is subject to change.                                              SCHJ1:      D:01/18/19 1457            Assessment      Pulmonary emboli - I26.99            CAP (community acquired pneumonia) - J18.9            Bronchiectasis - J47.9            Notes      New Medications      * Neb-NaCl 3% (Sodium Chloride 3% Neb) 4 ML VIAL.NEB: 4 ML INH RTBID PRN       CONGESTION 30 Days #60         Dx: Pulmonary emboli - I26.99      Changed Medications      * Apixaban (Eliquis) 5 MG TABLET:         From: 5 MG PO BID 30 Days #70         To: 5 MG PO BID 30 Days #60      Discontinued Medications      * Levofloxacin (Levaquin*) 750 MG TABLET: 750 MG PO QDAY 5 Days #5      New Diagnostics      * Chest W/ Cont CT, 3 Months         Dx: Pulmonary emboli - I26.99      * BMP, 3 Months         Dx: Pulmonary emboli - I26.99      ASSESSMENT:      1. Acute bilateral pulmonary emboli on anticoagulation with Eliquis.       2. Community acquired pneumonia from unspecified organism resolving.       3. Acute  hypoxic respiratory failure secondary to pulmonary emboli resolved.       4. Left lower extremity deep venous thrombosis.       5. Bronchiectasis with recent acute exacerbation resolving.       6. Chronic cognitive impairment.             PLAN:      1. I will continue the patient on Eliquis 5 mg PO twice daily. I have provided     him with refills for the next 3 months and then we will check a chest CT scan     with contrast afterwards to evaluate for resolution of his pulmonary emboli and     pneumonia.       2. I have given the patient a nebulizer machine today and prescribed 3% saline     nebs to use as needed for congestion to help with airway clearance. The     patient's family thinks he is able to use the nebulizer machine as he was able     to do that without issue in the hospital and they can assist him as needed.       3. Follow up with Dr. Osman, Dr. Montesinos or Dr. Renteria in 3 months, sooner if     needed.            Patient Education      Patient Education Provided:  How to use a Nebulizer      Time Spent:  > 50% /[employer] [facility]                 Disclaimer: Converted document may not contain table formatting or lab diagrams. Please see [employer] for the authenticated document.

## 2021-06-01 ENCOUNTER — TRANSCRIBE ORDERS (OUTPATIENT)
Dept: ADMINISTRATIVE | Facility: HOSPITAL | Age: 81
End: 2021-06-01

## 2021-06-01 DIAGNOSIS — I26.99 PULMONARY EMBOLISM AND INFARCTION (HCC): Primary | ICD-10-CM

## 2021-07-22 ENCOUNTER — APPOINTMENT (OUTPATIENT)
Dept: CARDIOLOGY | Facility: HOSPITAL | Age: 81
End: 2021-07-22

## 2021-07-22 ENCOUNTER — APPOINTMENT (OUTPATIENT)
Dept: CT IMAGING | Facility: HOSPITAL | Age: 81
End: 2021-07-22

## 2021-08-17 RX ORDER — APIXABAN 5 MG/1
TABLET, FILM COATED ORAL
Qty: 60 TABLET | Refills: 11 | Status: SHIPPED | OUTPATIENT
Start: 2021-08-17

## 2021-08-30 ENCOUNTER — APPOINTMENT (OUTPATIENT)
Dept: CT IMAGING | Facility: HOSPITAL | Age: 81
End: 2021-08-30

## 2021-08-30 ENCOUNTER — APPOINTMENT (OUTPATIENT)
Dept: CARDIOLOGY | Facility: HOSPITAL | Age: 81
End: 2021-08-30

## 2021-08-30 DIAGNOSIS — Z86.711 HISTORY OF PULMONARY EMBOLISM: Primary | ICD-10-CM

## 2021-10-05 ENCOUNTER — APPOINTMENT (OUTPATIENT)
Dept: CARDIOLOGY | Facility: HOSPITAL | Age: 81
End: 2021-10-05

## 2021-10-05 ENCOUNTER — APPOINTMENT (OUTPATIENT)
Dept: CT IMAGING | Facility: HOSPITAL | Age: 81
End: 2021-10-05

## 2021-11-01 ENCOUNTER — TELEPHONE (OUTPATIENT)
Dept: UROLOGY | Facility: CLINIC | Age: 81
End: 2021-11-01

## 2021-11-01 NOTE — TELEPHONE ENCOUNTER
What medications are you currently taking:   Current Outpatient Medications on File Prior to Visit   Medication Sig Dispense Refill   • Eliquis 5 MG tablet tablet TAKE 1 TABLET BY MOUTH TWICE DAILY 60 tablet 11     No current facility-administered medications on file prior to visit.        NURSE FROM PRACTICE CALLING IN TO REQUEST REFILLS FOR PATIENT HOWEVER THE MEDICINE SHE DESCRIBED IS NOT IN THE CHART , CLAIMS PT IS ALREADY ON THEM AND JUST NEEDS THEM REFILLED OR ELSE HE WILL NOT BE ABLE TO URINATE. WAS TOLD HE IS AN ESTABLISHED PT OF DR. VELIZ.    Tamsulosin 4 MG  FINASTERIDE 5 MG

## 2021-11-02 ENCOUNTER — TELEPHONE (OUTPATIENT)
Dept: UROLOGY | Facility: CLINIC | Age: 81
End: 2021-11-02

## 2021-11-02 DIAGNOSIS — N40.1 BENIGN PROSTATIC HYPERPLASIA WITH WEAK URINARY STREAM: Primary | ICD-10-CM

## 2021-11-02 DIAGNOSIS — R39.12 BENIGN PROSTATIC HYPERPLASIA WITH WEAK URINARY STREAM: Primary | ICD-10-CM

## 2021-11-02 RX ORDER — FINASTERIDE 5 MG/1
5 TABLET, FILM COATED ORAL DAILY
Qty: 90 TABLET | Refills: 4 | Status: SHIPPED | OUTPATIENT
Start: 2021-11-02 | End: 2022-11-03

## 2021-11-02 RX ORDER — TAMSULOSIN HYDROCHLORIDE 0.4 MG/1
0.8 CAPSULE ORAL DAILY
Qty: 180 CAPSULE | Refills: 4 | Status: SHIPPED | OUTPATIENT
Start: 2021-11-02

## 2021-11-02 NOTE — TELEPHONE ENCOUNTER
I don't know how long we have not seen this patient since 8/20/20 smooth is on maternity leave at this point. sc

## 2021-11-02 NOTE — TELEPHONE ENCOUNTER
Caller: MORENO VARGAS     Relationship to patient: SPOUSE    Best call back number: [phone]    Patient is needing: PT WIFE IS CALLING ABOUT MEDICATION REFILL. SHE STATED SHE SPOKE TO SOMEONE YESTERDAY REGARDING SITUATION. THERE IS A TEL ENCOUNTER IN CHART FROM 11.01.21 WITH MESSAGES CONCERNING THE REFILL. IF SOMEONE COULD CONTACT PT ASAP, I ATTEMPTED TO WARM TRANSFER TO CLINICAL LINE BUT NO ANSWER.

## 2021-11-22 ENCOUNTER — TELEPHONE (OUTPATIENT)
Dept: PULMONOLOGY | Facility: CLINIC | Age: 81
End: 2021-11-22

## 2021-11-23 ENCOUNTER — APPOINTMENT (OUTPATIENT)
Dept: CT IMAGING | Facility: HOSPITAL | Age: 81
End: 2021-11-23

## 2021-11-23 ENCOUNTER — APPOINTMENT (OUTPATIENT)
Dept: CARDIOLOGY | Facility: HOSPITAL | Age: 81
End: 2021-11-23

## 2021-12-03 DIAGNOSIS — I82.411 ACUTE DEEP VEIN THROMBOSIS (DVT) OF FEMORAL VEIN OF RIGHT LOWER EXTREMITY (HCC): Primary | ICD-10-CM

## 2022-01-05 ENCOUNTER — TELEPHONE (OUTPATIENT)
Dept: UROLOGY | Facility: CLINIC | Age: 82
End: 2022-01-05

## 2022-01-12 NOTE — TELEPHONE ENCOUNTER
PER CANCEL NOTE PT CALLED BACK AND STATED HE WAS IN [place] AND WOULD CALL WHEN HE RETURNS/CG 1-12-22

## 2022-01-25 ENCOUNTER — APPOINTMENT (OUTPATIENT)
Dept: CARDIOLOGY | Facility: HOSPITAL | Age: 82
End: 2022-01-25

## 2022-01-25 ENCOUNTER — APPOINTMENT (OUTPATIENT)
Dept: CT IMAGING | Facility: HOSPITAL | Age: 82
End: 2022-01-25

## 2022-03-14 DIAGNOSIS — R06.09 DOE (DYSPNEA ON EXERTION): Primary | ICD-10-CM

## 2022-03-15 ENCOUNTER — APPOINTMENT (OUTPATIENT)
Dept: CT IMAGING | Facility: HOSPITAL | Age: 82
End: 2022-03-15

## 2022-03-15 ENCOUNTER — APPOINTMENT (OUTPATIENT)
Dept: CARDIOLOGY | Facility: HOSPITAL | Age: 82
End: 2022-03-15

## 2022-05-03 ENCOUNTER — HOSPITAL ENCOUNTER (OUTPATIENT)
Dept: CARDIOLOGY | Facility: HOSPITAL | Age: 82
End: 2022-05-03

## 2022-05-03 ENCOUNTER — APPOINTMENT (OUTPATIENT)
Dept: CARDIOLOGY | Facility: HOSPITAL | Age: 82
End: 2022-05-03

## 2022-05-03 ENCOUNTER — APPOINTMENT (OUTPATIENT)
Dept: CT IMAGING | Facility: HOSPITAL | Age: 82
End: 2022-05-03

## 2022-06-07 DIAGNOSIS — R06.09 DOE (DYSPNEA ON EXERTION): Primary | ICD-10-CM

## 2022-11-03 DIAGNOSIS — R39.12 BENIGN PROSTATIC HYPERPLASIA WITH WEAK URINARY STREAM: ICD-10-CM

## 2022-11-03 DIAGNOSIS — N40.1 BENIGN PROSTATIC HYPERPLASIA WITH WEAK URINARY STREAM: ICD-10-CM

## 2022-11-03 RX ORDER — FINASTERIDE 5 MG/1
5 TABLET, FILM COATED ORAL DAILY
Qty: 90 TABLET | Refills: 4 | Status: SHIPPED | OUTPATIENT
Start: 2022-11-03

## 2022-11-07 RX ORDER — FINASTERIDE 5 MG/1
5 TABLET, FILM COATED ORAL DAILY
Qty: 90 TABLET | Refills: 4 | Status: SHIPPED | OUTPATIENT
Start: 2022-11-07

## 2023-04-20 DIAGNOSIS — N40.1 BENIGN PROSTATIC HYPERPLASIA WITH WEAK URINARY STREAM: ICD-10-CM

## 2023-04-20 DIAGNOSIS — R39.12 BENIGN PROSTATIC HYPERPLASIA WITH WEAK URINARY STREAM: ICD-10-CM

## 2023-04-20 RX ORDER — TAMSULOSIN HYDROCHLORIDE 0.4 MG/1
0.8 CAPSULE ORAL DAILY
Qty: 180 CAPSULE | Refills: 4 | Status: SHIPPED | OUTPATIENT
Start: 2023-04-20

## 2024-01-08 DIAGNOSIS — N40.1 BENIGN PROSTATIC HYPERPLASIA WITH WEAK URINARY STREAM: ICD-10-CM

## 2024-01-08 DIAGNOSIS — R39.12 BENIGN PROSTATIC HYPERPLASIA WITH WEAK URINARY STREAM: ICD-10-CM

## 2024-01-09 RX ORDER — FINASTERIDE 5 MG/1
5 TABLET, FILM COATED ORAL DAILY
Qty: 90 TABLET | Refills: 4 | Status: SHIPPED | OUTPATIENT
Start: 2024-01-09

## 2024-06-27 DIAGNOSIS — R39.12 BENIGN PROSTATIC HYPERPLASIA WITH WEAK URINARY STREAM: ICD-10-CM

## 2024-06-27 DIAGNOSIS — N40.1 BENIGN PROSTATIC HYPERPLASIA WITH WEAK URINARY STREAM: ICD-10-CM

## 2024-06-27 RX ORDER — TAMSULOSIN HYDROCHLORIDE 0.4 MG/1
0.8 CAPSULE ORAL DAILY
Qty: 180 CAPSULE | Refills: 4 | Status: SHIPPED | OUTPATIENT
Start: 2024-06-27

## 2025-01-21 ENCOUNTER — TELEPHONE (OUTPATIENT)
Dept: UROLOGY | Age: 85
End: 2025-01-21

## 2025-01-21 DIAGNOSIS — R39.12 BENIGN PROSTATIC HYPERPLASIA WITH WEAK URINARY STREAM: ICD-10-CM

## 2025-01-21 DIAGNOSIS — N40.1 BENIGN PROSTATIC HYPERPLASIA WITH WEAK URINARY STREAM: ICD-10-CM

## 2025-01-21 RX ORDER — FINASTERIDE 5 MG/1
5 TABLET, FILM COATED ORAL DAILY
Qty: 90 TABLET | Refills: 4 | OUTPATIENT
Start: 2025-01-21

## 2025-01-21 NOTE — TELEPHONE ENCOUNTER
Provider: DR VELIZ    Caller: Shantelle VARGAS    Relationship to Patient: Emergency Contact    DOCUMENTATION PURPOSES ONLY    Reason for Call: RECVD CALL FROM MORENO VARGAS TO SCHEDULE FOLLOW UP APPT FOR MEDICATION REFILL.(PER MEDICATION REFILL REQUEST 1/21/25)    CONFIRMED WITH OFFICE - NEW REFERRAL WILL BE REQUIRED , PT WAS LAST SEEN 8/28/20.  MEDICATION REFILL CANNOT BE COMPLETED UNTIL PATIENT HAS BEEN SEEN IN THE OFFICE.

## 2025-01-21 NOTE — TELEPHONE ENCOUNTER
Spoke with both providers about how long it's been since either has seen the pt and in order for them to send in a refill the pt needs to call and schedule a follow up with Serge or Brenda and then to let whoever schedules pt to let me know so they can send in enough to get him to that appt. Left all that on pt's VM.